# Patient Record
Sex: FEMALE | Race: WHITE | NOT HISPANIC OR LATINO
[De-identification: names, ages, dates, MRNs, and addresses within clinical notes are randomized per-mention and may not be internally consistent; named-entity substitution may affect disease eponyms.]

---

## 2017-01-25 ENCOUNTER — APPOINTMENT (OUTPATIENT)
Dept: INTERNAL MEDICINE | Facility: CLINIC | Age: 27
End: 2017-01-25

## 2017-01-25 ENCOUNTER — NON-APPOINTMENT (OUTPATIENT)
Age: 27
End: 2017-01-25

## 2017-01-25 VITALS — HEIGHT: 65 IN | WEIGHT: 184 LBS | BODY MASS INDEX: 30.66 KG/M2

## 2017-01-25 VITALS
TEMPERATURE: 96.7 F | RESPIRATION RATE: 14 BRPM | HEART RATE: 78 BPM | SYSTOLIC BLOOD PRESSURE: 110 MMHG | DIASTOLIC BLOOD PRESSURE: 72 MMHG

## 2017-01-25 DIAGNOSIS — Z82.49 FAMILY HISTORY OF ISCHEMIC HEART DISEASE AND OTHER DISEASES OF THE CIRCULATORY SYSTEM: ICD-10-CM

## 2017-01-25 DIAGNOSIS — J30.9 ALLERGIC RHINITIS, UNSPECIFIED: ICD-10-CM

## 2017-01-25 DIAGNOSIS — Z78.9 OTHER SPECIFIED HEALTH STATUS: ICD-10-CM

## 2017-01-25 DIAGNOSIS — I37.1 NONRHEUMATIC PULMONARY VALVE INSUFFICIENCY: ICD-10-CM

## 2017-01-25 DIAGNOSIS — Z00.00 ENCOUNTER FOR GENERAL ADULT MEDICAL EXAMINATION W/OUT ABNORMAL FINDINGS: ICD-10-CM

## 2017-01-25 RX ORDER — CETIRIZINE HCL 10 MG
10 TABLET ORAL
Refills: 0 | Status: ACTIVE | COMMUNITY

## 2017-01-26 ENCOUNTER — RX RENEWAL (OUTPATIENT)
Age: 27
End: 2017-01-26

## 2017-01-26 LAB
25(OH)D3 SERPL-MCNC: 6.8 NG/ML
ALBUMIN SERPL ELPH-MCNC: 4.4 G/DL
ALP BLD-CCNC: 80 U/L
ALT SERPL-CCNC: 9 U/L
ANION GAP SERPL CALC-SCNC: 22 MMOL/L
AST SERPL-CCNC: 19 U/L
BASOPHILS # BLD AUTO: 0.02 K/UL
BASOPHILS NFR BLD AUTO: 0.3 %
BILIRUB SERPL-MCNC: 0.4 MG/DL
BUN SERPL-MCNC: 14 MG/DL
CALCIUM SERPL-MCNC: 9.3 MG/DL
CHLORIDE SERPL-SCNC: 106 MMOL/L
CHOLEST SERPL-MCNC: 272 MG/DL
CHOLEST/HDLC SERPL: 3.4 RATIO
CO2 SERPL-SCNC: 16 MMOL/L
CREAT SERPL-MCNC: 0.71 MG/DL
EOSINOPHIL # BLD AUTO: 0.28 K/UL
EOSINOPHIL NFR BLD AUTO: 3.6 %
GLUCOSE SERPL-MCNC: 79 MG/DL
HCT VFR BLD CALC: 40.8 %
HDLC SERPL-MCNC: 79 MG/DL
HGB BLD-MCNC: 13.3 G/DL
IMM GRANULOCYTES NFR BLD AUTO: 0.1 %
LDLC SERPL CALC-MCNC: 171 MG/DL
LYMPHOCYTES # BLD AUTO: 3.01 K/UL
LYMPHOCYTES NFR BLD AUTO: 38.6 %
MAN DIFF?: NORMAL
MCHC RBC-ENTMCNC: 31.8 PG
MCHC RBC-ENTMCNC: 32.6 GM/DL
MCV RBC AUTO: 97.6 FL
MONOCYTES # BLD AUTO: 0.5 K/UL
MONOCYTES NFR BLD AUTO: 6.4 %
NEUTROPHILS # BLD AUTO: 3.98 K/UL
NEUTROPHILS NFR BLD AUTO: 51 %
PLATELET # BLD AUTO: 295 K/UL
POTASSIUM SERPL-SCNC: 4.4 MMOL/L
PROT SERPL-MCNC: 7.7 G/DL
RBC # BLD: 4.18 M/UL
RBC # FLD: 12.4 %
SODIUM SERPL-SCNC: 144 MMOL/L
TRIGL SERPL-MCNC: 110 MG/DL
WBC # FLD AUTO: 7.8 K/UL

## 2017-01-27 LAB
T4 FREE SERPL-MCNC: 1.2 NG/DL
TSH SERPL-ACNC: 3.22 UIU/ML

## 2017-06-16 PROBLEM — Z00.00 ENCOUNTER FOR PREVENTIVE HEALTH EXAMINATION: Noted: 2017-06-16

## 2017-06-22 ENCOUNTER — APPOINTMENT (OUTPATIENT)
Dept: ORTHOPEDIC SURGERY | Facility: CLINIC | Age: 27
End: 2017-06-22

## 2017-06-22 VITALS
HEIGHT: 66 IN | HEART RATE: 80 BPM | BODY MASS INDEX: 28.93 KG/M2 | WEIGHT: 180 LBS | DIASTOLIC BLOOD PRESSURE: 81 MMHG | SYSTOLIC BLOOD PRESSURE: 123 MMHG

## 2017-06-22 VITALS
HEIGHT: 66 IN | WEIGHT: 180 LBS | BODY MASS INDEX: 28.93 KG/M2 | HEART RATE: 80 BPM | SYSTOLIC BLOOD PRESSURE: 123 MMHG | DIASTOLIC BLOOD PRESSURE: 81 MMHG

## 2017-06-22 DIAGNOSIS — Z78.9 OTHER SPECIFIED HEALTH STATUS: ICD-10-CM

## 2017-06-22 DIAGNOSIS — Z87.891 PERSONAL HISTORY OF NICOTINE DEPENDENCE: ICD-10-CM

## 2017-06-22 RX ORDER — CETIRIZINE HCL 10 MG
TABLET ORAL
Refills: 0 | Status: ACTIVE | COMMUNITY

## 2017-06-22 RX ORDER — FLUTICASONE PROPIONATE 50 MCG
SPRAY, SUSPENSION NASAL
Refills: 0 | Status: ACTIVE | COMMUNITY

## 2017-06-22 RX ORDER — LEVONORGESTREL / ETHINYL ESTRADIOL AND ETHINYL ESTRADIOL 150-30(84)
KIT ORAL
Refills: 0 | Status: ACTIVE | COMMUNITY

## 2017-07-20 ENCOUNTER — APPOINTMENT (OUTPATIENT)
Dept: NEUROSURGERY | Facility: CLINIC | Age: 27
End: 2017-07-20

## 2017-09-12 ENCOUNTER — APPOINTMENT (OUTPATIENT)
Dept: SPINE | Facility: CLINIC | Age: 27
End: 2017-09-12
Payer: MEDICAID

## 2017-09-12 VITALS
BODY MASS INDEX: 29.73 KG/M2 | SYSTOLIC BLOOD PRESSURE: 116 MMHG | HEIGHT: 66 IN | DIASTOLIC BLOOD PRESSURE: 74 MMHG | WEIGHT: 185 LBS

## 2017-09-12 PROCEDURE — 99204 OFFICE O/P NEW MOD 45 MIN: CPT

## 2017-09-12 RX ORDER — ERGOCALCIFEROL 1.25 MG/1
1.25 MG CAPSULE, LIQUID FILLED ORAL
Qty: 24 | Refills: 3 | Status: DISCONTINUED | COMMUNITY
Start: 2017-01-26 | End: 2017-09-12

## 2017-09-12 RX ORDER — NAPROXEN SODIUM 220 MG
TABLET ORAL
Refills: 0 | Status: ACTIVE | COMMUNITY

## 2017-09-12 RX ORDER — LEVONORGESTREL / ETHINYL ESTRADIOL AND ETHINYL ESTRADIOL 150-30(84)
KIT ORAL
Refills: 0 | Status: ACTIVE | COMMUNITY

## 2017-09-12 RX ORDER — FLUTICASONE PROPIONATE 50 UG/1
50 SPRAY, METERED NASAL
Refills: 0 | Status: DISCONTINUED | COMMUNITY
End: 2017-09-12

## 2017-10-13 ENCOUNTER — APPOINTMENT (OUTPATIENT)
Dept: MRI IMAGING | Facility: CLINIC | Age: 27
End: 2017-10-13

## 2017-10-13 ENCOUNTER — OUTPATIENT (OUTPATIENT)
Dept: OUTPATIENT SERVICES | Facility: HOSPITAL | Age: 27
LOS: 1 days | End: 2017-10-13
Payer: MEDICAID

## 2017-10-13 ENCOUNTER — APPOINTMENT (OUTPATIENT)
Dept: MRI IMAGING | Facility: CLINIC | Age: 27
End: 2017-10-13
Payer: MEDICAID

## 2017-10-13 DIAGNOSIS — Z00.8 ENCOUNTER FOR OTHER GENERAL EXAMINATION: ICD-10-CM

## 2017-10-13 PROCEDURE — 72146 MRI CHEST SPINE W/O DYE: CPT | Mod: 26

## 2017-10-13 PROCEDURE — 72146 MRI CHEST SPINE W/O DYE: CPT

## 2017-10-13 PROCEDURE — 72148 MRI LUMBAR SPINE W/O DYE: CPT | Mod: 26

## 2017-10-13 PROCEDURE — 72148 MRI LUMBAR SPINE W/O DYE: CPT

## 2017-11-01 ENCOUNTER — APPOINTMENT (OUTPATIENT)
Dept: SPINE | Facility: CLINIC | Age: 27
End: 2017-11-01
Payer: MEDICAID

## 2017-11-01 VITALS
HEART RATE: 77 BPM | HEIGHT: 66 IN | BODY MASS INDEX: 30.53 KG/M2 | SYSTOLIC BLOOD PRESSURE: 116 MMHG | DIASTOLIC BLOOD PRESSURE: 79 MMHG | WEIGHT: 190 LBS

## 2017-11-01 DIAGNOSIS — M54.9 DORSALGIA, UNSPECIFIED: ICD-10-CM

## 2017-11-01 DIAGNOSIS — G89.29 DORSALGIA, UNSPECIFIED: ICD-10-CM

## 2017-11-01 PROCEDURE — 99214 OFFICE O/P EST MOD 30 MIN: CPT

## 2018-10-09 ENCOUNTER — APPOINTMENT (OUTPATIENT)
Dept: INTERNAL MEDICINE | Facility: CLINIC | Age: 28
End: 2018-10-09
Payer: MEDICAID

## 2018-10-09 VITALS
TEMPERATURE: 98.8 F | DIASTOLIC BLOOD PRESSURE: 84 MMHG | SYSTOLIC BLOOD PRESSURE: 124 MMHG | HEIGHT: 66 IN | WEIGHT: 200 LBS | BODY MASS INDEX: 32.14 KG/M2 | OXYGEN SATURATION: 98 % | HEART RATE: 85 BPM

## 2018-10-09 DIAGNOSIS — E55.9 VITAMIN D DEFICIENCY, UNSPECIFIED: ICD-10-CM

## 2018-10-09 DIAGNOSIS — E78.5 HYPERLIPIDEMIA, UNSPECIFIED: ICD-10-CM

## 2018-10-09 DIAGNOSIS — G62.9 POLYNEUROPATHY, UNSPECIFIED: ICD-10-CM

## 2018-10-09 DIAGNOSIS — E66.9 OBESITY, UNSPECIFIED: ICD-10-CM

## 2018-10-09 PROCEDURE — G0447 BEHAVIOR COUNSEL OBESITY 15M: CPT

## 2018-10-09 PROCEDURE — 99215 OFFICE O/P EST HI 40 MIN: CPT | Mod: 25

## 2018-10-09 PROCEDURE — 36415 COLL VENOUS BLD VENIPUNCTURE: CPT

## 2018-10-09 NOTE — REVIEW OF SYSTEMS
[Abdominal Pain] : no abdominal pain [Nausea] : no nausea [Constipation] : no constipation [Diarrhea] : diarrhea [Vomiting] : no vomiting [Heartburn] : heartburn [Negative] : Heme/Lymph

## 2018-10-09 NOTE — PHYSICAL EXAM
[Normal Female:] : bladder was normal on palpation [Normal] : normal gait, coordination grossly intact, no focal deficits [de-identified] : Tenderness to lumbar and sacral area

## 2018-10-09 NOTE — ASSESSMENT
[FreeTextEntry1] : 1) GERD: Will start trial of pantoprazole 40 mg po qd. Advised to return in one month. No red flag symptoms on exam. Lifestyle counseling provided\par 2)Neuropathy: Spinal orthopedic doctor referral given to patient. Advised to continue with aquatic therapy. Will start trial of duloxetine. \par 3)Lumbar radiculopathy: Will place referral for spinal orthopedic doctor. Advised to continue with aquatic therapy\par 4) Obesity: Patient on diet, encouraged to continue with diet and moderate activity. Advised to continue with aquatic therapy, and walking as tolerated,\par 5)Hypovitaminosis D: Check vitamin d levels\par 6) Hyperlipidemia: Will check lipid panel. Lifestyle counseling and walking as tolerated advised.

## 2018-10-09 NOTE — HISTORY OF PRESENT ILLNESS
[FreeTextEntry8] : Patient presents to address reflux symptoms. States she has burning sensation in chest and up to throat. Symptoms worse when eating certain foods including sauce, pizza or alcohol. Patient denies any pain on swallowing, vomiting, weight loss or difficulty swallowing. Patient states she had two episodes of bleeding on wiping, after painful bowel movements. Patient also has back pain and shooting pain down leg. Gabapentin did not help with symptoms.

## 2018-10-09 NOTE — COUNSELING
[Weight management counseling provided] : Weight management [___ min/wk activity recommended] : [unfilled] min/wk activity recommended [ - Behavioral Counseling for Obesity (Face-to-Face for 15 Minutes)] : Behavioral Counseling for Obesity (Face-to-Face for 15 Minutes)

## 2018-10-11 LAB
25(OH)D3 SERPL-MCNC: 30.6 NG/ML
ALBUMIN SERPL ELPH-MCNC: 4.3 G/DL
ALP BLD-CCNC: 105 U/L
ALT SERPL-CCNC: 25 U/L
ANION GAP SERPL CALC-SCNC: 19 MMOL/L
AST SERPL-CCNC: 23 U/L
BASOPHILS # BLD AUTO: 0.02 K/UL
BASOPHILS NFR BLD AUTO: 0.3 %
BILIRUB SERPL-MCNC: 0.3 MG/DL
BUN SERPL-MCNC: 11 MG/DL
CALCIUM SERPL-MCNC: 9 MG/DL
CHLORIDE SERPL-SCNC: 107 MMOL/L
CHOLEST SERPL-MCNC: 277 MG/DL
CHOLEST/HDLC SERPL: 4.5 RATIO
CO2 SERPL-SCNC: 18 MMOL/L
CREAT SERPL-MCNC: 0.75 MG/DL
EOSINOPHIL # BLD AUTO: 0.4 K/UL
EOSINOPHIL NFR BLD AUTO: 5 %
GLUCOSE SERPL-MCNC: 83 MG/DL
HBA1C MFR BLD HPLC: 5.1 %
HCT VFR BLD CALC: 42.8 %
HDLC SERPL-MCNC: 62 MG/DL
HGB BLD-MCNC: 13.5 G/DL
IMM GRANULOCYTES NFR BLD AUTO: 0.1 %
LDLC SERPL CALC-MCNC: 195 MG/DL
LYMPHOCYTES # BLD AUTO: 3.55 K/UL
LYMPHOCYTES NFR BLD AUTO: 44.5 %
MAN DIFF?: NORMAL
MCHC RBC-ENTMCNC: 30.8 PG
MCHC RBC-ENTMCNC: 31.5 GM/DL
MCV RBC AUTO: 97.5 FL
MONOCYTES # BLD AUTO: 0.47 K/UL
MONOCYTES NFR BLD AUTO: 5.9 %
NEUTROPHILS # BLD AUTO: 3.52 K/UL
NEUTROPHILS NFR BLD AUTO: 44.2 %
PLATELET # BLD AUTO: 321 K/UL
POTASSIUM SERPL-SCNC: 4.5 MMOL/L
PROT SERPL-MCNC: 7.7 G/DL
RBC # BLD: 4.39 M/UL
RBC # FLD: 13.2 %
SODIUM SERPL-SCNC: 144 MMOL/L
TRIGL SERPL-MCNC: 102 MG/DL
TSH SERPL-ACNC: 6.86 UIU/ML
WBC # FLD AUTO: 7.97 K/UL

## 2018-10-12 PROBLEM — E78.5 HYPERLIPEMIA: Status: ACTIVE | Noted: 2018-10-12

## 2018-11-07 ENCOUNTER — RX RENEWAL (OUTPATIENT)
Age: 28
End: 2018-11-07

## 2018-11-12 ENCOUNTER — APPOINTMENT (OUTPATIENT)
Dept: INTERNAL MEDICINE | Facility: CLINIC | Age: 28
End: 2018-11-12

## 2018-12-04 ENCOUNTER — RX RENEWAL (OUTPATIENT)
Age: 28
End: 2018-12-04

## 2018-12-18 ENCOUNTER — APPOINTMENT (OUTPATIENT)
Dept: INTERNAL MEDICINE | Facility: CLINIC | Age: 28
End: 2018-12-18
Payer: MEDICAID

## 2018-12-18 VITALS — RESPIRATION RATE: 16 BRPM

## 2018-12-18 VITALS
BODY MASS INDEX: 34.39 KG/M2 | TEMPERATURE: 98.6 F | SYSTOLIC BLOOD PRESSURE: 127 MMHG | WEIGHT: 214 LBS | HEIGHT: 66 IN | HEART RATE: 85 BPM | OXYGEN SATURATION: 98 % | DIASTOLIC BLOOD PRESSURE: 86 MMHG

## 2018-12-18 DIAGNOSIS — R11.10 VOMITING, UNSPECIFIED: ICD-10-CM

## 2018-12-18 DIAGNOSIS — R53.83 OTHER FATIGUE: ICD-10-CM

## 2018-12-18 DIAGNOSIS — M54.16 RADICULOPATHY, LUMBAR REGION: ICD-10-CM

## 2018-12-18 PROCEDURE — 99214 OFFICE O/P EST MOD 30 MIN: CPT | Mod: 25

## 2018-12-18 PROCEDURE — 36415 COLL VENOUS BLD VENIPUNCTURE: CPT

## 2018-12-18 RX ORDER — DULOXETINE HYDROCHLORIDE 30 MG/1
30 CAPSULE, DELAYED RELEASE PELLETS ORAL
Qty: 30 | Refills: 0 | Status: DISCONTINUED | COMMUNITY
Start: 2018-10-09 | End: 2018-12-18

## 2018-12-18 RX ORDER — PREGABALIN 100 MG/1
100 CAPSULE ORAL
Qty: 30 | Refills: 0 | Status: ACTIVE | COMMUNITY
Start: 2018-12-18

## 2018-12-18 NOTE — REVIEW OF SYSTEMS
[Fatigue] : fatigue [Constipation] : constipation [Vomiting] : vomiting [Hair Changes] : hair changes [Negative] : Heme/Lymph [Fever] : no fever [Chills] : no chills [Hot Flashes] : no hot flashes [Night Sweats] : no night sweats [Abdominal Pain] : no abdominal pain [Diarrhea] : diarrhea [Heartburn] : no heartburn [Melena] : no melena [Mole Changes] : no mole changes [Nail Changes] : no nail changes [Skin Rash] : no skin rash [de-identified] : dry skin

## 2018-12-18 NOTE — ASSESSMENT
[FreeTextEntry1] : 1) Lethargy: Given cold intolerance, skin and hair changes, will check TSH. Hypothyroidism is high in the differential. Will also check Vitamin B12 and Vitamin D levels and CBC for anemia\par 2) Vomiting: May be secondary to lethargy from hypothyroidism, will check CMP, amylase, lipase and abdominal U/S. \par 3) Elevated TSH: Given symptoms, will check TSH and free T4\par 4) GERD: resolved, advised to attempt pantoprazole 20 mg.\par 5) Hyperlipidemia: Patient has taken atorvastatin as prescribed, has been attempting to watch diet. Exercise has been difficult due to back pain. Will check lipid levels.\par 6) Lumbar radiculopathy: Patient on Lyrica as per pain management. \par Patient to return to office in 6-8 weeks to reassess.

## 2018-12-18 NOTE — PHYSICAL EXAM
[No Acute Distress] : no acute distress [Well Nourished] : well nourished [Well Developed] : well developed [Well-Appearing] : well-appearing [Normal Sclera/Conjunctiva] : normal sclera/conjunctiva [PERRL] : pupils equal round and reactive to light [EOMI] : extraocular movements intact [Normal Outer Ear/Nose] : the outer ears and nose were normal in appearance [Normal Oropharynx] : the oropharynx was normal [Normal TMs] : both tympanic membranes were normal [No JVD] : no jugular venous distention [Supple] : supple [No Lymphadenopathy] : no lymphadenopathy [Thyroid Normal, No Nodules] : the thyroid was normal and there were no nodules present [No Respiratory Distress] : no respiratory distress  [Clear to Auscultation] : lungs were clear to auscultation bilaterally [No Accessory Muscle Use] : no accessory muscle use [Normal Rate] : normal rate  [Regular Rhythm] : with a regular rhythm [Normal S1, S2] : normal S1 and S2 [No Murmur] : no murmur heard [No Carotid Bruits] : no carotid bruits [No Abdominal Bruit] : a ~M bruit was not heard ~T in the abdomen [No Varicosities] : no varicosities [Pedal Pulses Present] : the pedal pulses are present [No Edema] : there was no peripheral edema [No Extremity Clubbing/Cyanosis] : no extremity clubbing/cyanosis [No Palpable Aorta] : no palpable aorta [Soft] : abdomen soft [Non Tender] : non-tender [Non-distended] : non-distended [No Masses] : no abdominal mass palpated [No HSM] : no HSM [Normal Bowel Sounds] : normal bowel sounds [Normal Supraclavicular Nodes] : no supraclavicular lymphadenopathy [Normal Posterior Cervical Nodes] : no posterior cervical lymphadenopathy [Normal Anterior Cervical Nodes] : no anterior cervical lymphadenopathy [No CVA Tenderness] : no CVA  tenderness [No Joint Swelling] : no joint swelling [Grossly Normal Strength/Tone] : grossly normal strength/tone [No Rash] : no rash [de-identified] : dry skin

## 2018-12-18 NOTE — HISTORY OF PRESENT ILLNESS
[FreeTextEntry1] : Patient presents for f/u of GERD and for concern over lethargy [de-identified] : Patient states that GERD has improved. Patient also states she feels more lethargic last few weeks. Patient also has had constipation last few months intermittently and associated dry skin, hair loss and dry skin. Patient has had depression on and off since injuring back. Patient states she had some family stressors last few weeks but does not feel more depressed than usual. Patient also states that she has vomiting after eating last few weeks. Patient denies any changes in diet, abdominal pain, pain while eating, change in bowel habits or bloody stool. Patient denies any fevers, chills or night sweats. Patient on contraception and states that menstrual periods have not been more heavy than usual. Patient denies any headache with vomiting or any anxiety. Patient denies any rashes, myalgias or arthralgias.

## 2018-12-19 ENCOUNTER — MESSAGE (OUTPATIENT)
Age: 28
End: 2018-12-19

## 2018-12-19 LAB
25(OH)D3 SERPL-MCNC: 18.2 NG/ML
ALBUMIN SERPL ELPH-MCNC: 4 G/DL
ALP BLD-CCNC: 93 U/L
ALT SERPL-CCNC: 8 U/L
AMYLASE/CREAT SERPL: 116 U/L
ANION GAP SERPL CALC-SCNC: 16 MMOL/L
AST SERPL-CCNC: 15 U/L
BASOPHILS # BLD AUTO: 0.01 K/UL
BASOPHILS NFR BLD AUTO: 0.1 %
BILIRUB SERPL-MCNC: 0.3 MG/DL
BUN SERPL-MCNC: 11 MG/DL
CALCIUM SERPL-MCNC: 9 MG/DL
CHLORIDE SERPL-SCNC: 107 MMOL/L
CHOLEST SERPL-MCNC: 201 MG/DL
CHOLEST/HDLC SERPL: 3.7 RATIO
CK SERPL-CCNC: 38 U/L
CO2 SERPL-SCNC: 18 MMOL/L
CREAT SERPL-MCNC: 0.66 MG/DL
EOSINOPHIL # BLD AUTO: 0.3 K/UL
EOSINOPHIL NFR BLD AUTO: 3.4 %
GLUCOSE SERPL-MCNC: 83 MG/DL
HCT VFR BLD CALC: 38.5 %
HDLC SERPL-MCNC: 55 MG/DL
HGB BLD-MCNC: 12.8 G/DL
IMM GRANULOCYTES NFR BLD AUTO: 0.2 %
LDLC SERPL CALC-MCNC: 121 MG/DL
LPL SERPL-CCNC: 59 U/L
LYMPHOCYTES # BLD AUTO: 3.21 K/UL
LYMPHOCYTES NFR BLD AUTO: 36.5 %
MAN DIFF?: NORMAL
MCHC RBC-ENTMCNC: 31 PG
MCHC RBC-ENTMCNC: 33.2 GM/DL
MCV RBC AUTO: 93.2 FL
MONOCYTES # BLD AUTO: 0.51 K/UL
MONOCYTES NFR BLD AUTO: 5.8 %
NEUTROPHILS # BLD AUTO: 4.75 K/UL
NEUTROPHILS NFR BLD AUTO: 54 %
PLATELET # BLD AUTO: 311 K/UL
POTASSIUM SERPL-SCNC: 3.9 MMOL/L
PROT SERPL-MCNC: 7.1 G/DL
RBC # BLD: 4.13 M/UL
RBC # FLD: 13 %
SODIUM SERPL-SCNC: 141 MMOL/L
TRIGL SERPL-MCNC: 125 MG/DL
TSH SERPL-ACNC: 3.73 UIU/ML
VIT B12 SERPL-MCNC: 223 PG/ML
WBC # FLD AUTO: 8.8 K/UL

## 2019-01-15 ENCOUNTER — APPOINTMENT (OUTPATIENT)
Dept: ORTHOPEDIC SURGERY | Facility: CLINIC | Age: 29
End: 2019-01-15
Payer: MEDICAID

## 2019-01-15 VITALS
DIASTOLIC BLOOD PRESSURE: 88 MMHG | BODY MASS INDEX: 33.27 KG/M2 | WEIGHT: 207 LBS | SYSTOLIC BLOOD PRESSURE: 127 MMHG | HEIGHT: 66 IN | HEART RATE: 91 BPM

## 2019-01-15 DIAGNOSIS — J18.9 PNEUMONIA, UNSPECIFIED ORGANISM: ICD-10-CM

## 2019-01-15 DIAGNOSIS — M54.16 RADICULOPATHY, LUMBAR REGION: ICD-10-CM

## 2019-01-15 DIAGNOSIS — K21.0 GASTRO-ESOPHAGEAL REFLUX DISEASE WITH ESOPHAGITIS: ICD-10-CM

## 2019-01-15 DIAGNOSIS — M54.41 LUMBAGO WITH SCIATICA, LEFT SIDE: ICD-10-CM

## 2019-01-15 DIAGNOSIS — M54.42 LUMBAGO WITH SCIATICA, LEFT SIDE: ICD-10-CM

## 2019-01-15 DIAGNOSIS — G89.29 LUMBAGO WITH SCIATICA, LEFT SIDE: ICD-10-CM

## 2019-01-15 PROCEDURE — 99215 OFFICE O/P EST HI 40 MIN: CPT

## 2019-01-15 RX ORDER — DICLOFENAC SODIUM 75 MG/1
75 TABLET, DELAYED RELEASE ORAL
Qty: 60 | Refills: 0 | Status: ACTIVE | COMMUNITY
Start: 2019-01-15 | End: 1900-01-01

## 2019-01-22 ENCOUNTER — APPOINTMENT (OUTPATIENT)
Dept: INTERNAL MEDICINE | Facility: CLINIC | Age: 29
End: 2019-01-22

## 2019-02-11 ENCOUNTER — RX RENEWAL (OUTPATIENT)
Age: 29
End: 2019-02-11

## 2019-06-24 ENCOUNTER — RX RENEWAL (OUTPATIENT)
Age: 29
End: 2019-06-24

## 2019-06-24 RX ORDER — ERGOCALCIFEROL 1.25 MG/1
1.25 MG CAPSULE, LIQUID FILLED ORAL
Qty: 12 | Refills: 1 | Status: ACTIVE | COMMUNITY
Start: 2018-12-19 | End: 1900-01-01

## 2019-08-14 ENCOUNTER — LABORATORY RESULT (OUTPATIENT)
Age: 29
End: 2019-08-14

## 2019-08-14 ENCOUNTER — APPOINTMENT (OUTPATIENT)
Dept: INTERNAL MEDICINE | Facility: CLINIC | Age: 29
End: 2019-08-14
Payer: MEDICAID

## 2019-08-14 VITALS
BODY MASS INDEX: 32.55 KG/M2 | WEIGHT: 200.16 LBS | TEMPERATURE: 98.3 F | DIASTOLIC BLOOD PRESSURE: 89 MMHG | OXYGEN SATURATION: 98 % | HEIGHT: 65.75 IN | HEART RATE: 73 BPM | SYSTOLIC BLOOD PRESSURE: 123 MMHG

## 2019-08-14 DIAGNOSIS — G25.81 RESTLESS LEGS SYNDROME: ICD-10-CM

## 2019-08-14 DIAGNOSIS — E53.8 DEFICIENCY OF OTHER SPECIFIED B GROUP VITAMINS: ICD-10-CM

## 2019-08-14 DIAGNOSIS — K21.9 GASTRO-ESOPHAGEAL REFLUX DISEASE W/OUT ESOPHAGITIS: ICD-10-CM

## 2019-08-14 DIAGNOSIS — E78.2 MIXED HYPERLIPIDEMIA: ICD-10-CM

## 2019-08-14 PROCEDURE — 99214 OFFICE O/P EST MOD 30 MIN: CPT | Mod: 25

## 2019-08-14 PROCEDURE — 36415 COLL VENOUS BLD VENIPUNCTURE: CPT

## 2019-08-15 PROBLEM — G25.81 RESTLESS LEG SYNDROME: Status: ACTIVE | Noted: 2019-08-15

## 2019-08-15 PROBLEM — E53.8 LOW VITAMIN B12 LEVEL: Status: ACTIVE | Noted: 2019-08-15

## 2019-08-19 ENCOUNTER — MESSAGE (OUTPATIENT)
Age: 29
End: 2019-08-19

## 2019-08-19 LAB
25(OH)D3 SERPL-MCNC: 35.2 NG/ML
ALBUMIN SERPL ELPH-MCNC: 4.3 G/DL
ALP BLD-CCNC: 92 U/L
ALT SERPL-CCNC: 20 U/L
ANION GAP SERPL CALC-SCNC: 15 MMOL/L
AST SERPL-CCNC: 24 U/L
BASOPHILS # BLD AUTO: 0.02 K/UL
BASOPHILS NFR BLD AUTO: 0.3 %
BILIRUB SERPL-MCNC: 0.5 MG/DL
BUN SERPL-MCNC: 13 MG/DL
CALCIUM SERPL-MCNC: 9.2 MG/DL
CHLORIDE SERPL-SCNC: 103 MMOL/L
CHOLEST SERPL-MCNC: 197 MG/DL
CHOLEST/HDLC SERPL: 3.2 RATIO
CO2 SERPL-SCNC: 20 MMOL/L
CREAT SERPL-MCNC: 0.72 MG/DL
EOSINOPHIL # BLD AUTO: 0.3 K/UL
EOSINOPHIL NFR BLD AUTO: 5 %
ESTIMATED AVERAGE GLUCOSE: 94 MG/DL
FERRITIN SERPL-MCNC: 21 NG/ML
GLUCOSE SERPL-MCNC: 75 MG/DL
HBA1C MFR BLD HPLC: 4.9 %
HCT VFR BLD CALC: 40.4 %
HDLC SERPL-MCNC: 62 MG/DL
HGB BLD-MCNC: 13.5 G/DL
IMM GRANULOCYTES NFR BLD AUTO: 0.2 %
IRON SATN MFR SERPL: 22 %
IRON SERPL-MCNC: 87 UG/DL
LDLC SERPL CALC-MCNC: 121 MG/DL
LYMPHOCYTES # BLD AUTO: 2.1 K/UL
LYMPHOCYTES NFR BLD AUTO: 34.7 %
MAN DIFF?: NORMAL
MCHC RBC-ENTMCNC: 31.2 PG
MCHC RBC-ENTMCNC: 33.4 GM/DL
MCV RBC AUTO: 93.3 FL
MONOCYTES # BLD AUTO: 0.38 K/UL
MONOCYTES NFR BLD AUTO: 6.3 %
NEUTROPHILS # BLD AUTO: 3.25 K/UL
NEUTROPHILS NFR BLD AUTO: 53.5 %
PLATELET # BLD AUTO: 306 K/UL
POTASSIUM SERPL-SCNC: 4.1 MMOL/L
PROT SERPL-MCNC: 7.4 G/DL
RBC # BLD: 4.33 M/UL
RBC # FLD: 12.5 %
SODIUM SERPL-SCNC: 138 MMOL/L
TIBC SERPL-MCNC: 394 UG/DL
TRIGL SERPL-MCNC: 68 MG/DL
TSH SERPL-ACNC: 4.87 UIU/ML
UIBC SERPL-MCNC: 307 UG/DL
VIT B12 SERPL-MCNC: 220 PG/ML
WBC # FLD AUTO: 6.06 K/UL

## 2019-08-23 NOTE — REVIEW OF SYSTEMS
[Heartburn] : heartburn [Back Pain] : back pain [Negative] : Integumentary [Constipation] : no constipation [Abdominal Pain] : no abdominal pain [Diarrhea] : diarrhea [Vomiting] : no vomiting [Melena] : no melena

## 2019-08-23 NOTE — HISTORY OF PRESENT ILLNESS
[de-identified] : Patient states that she has reflux whenever she eats at times house over eats out. Patient feels burning sensation and sometimes will have vomiting with burning sensation. Patient has tried avoidance of triggers and has lost weight through diet. patient denies any dysphagia odynophagia or any family history of any stomach cancer. Right leg has sensation of having something crawling. patient denies any cramps. Patient's right leg feels restless. [FreeTextEntry1] : Patient presents to the office for reflux, HLD and low Vitamin B12

## 2019-08-23 NOTE — ASSESSMENT
[FreeTextEntry1] : 1) GERD: Patient has refractory GERD on PPI 20-40 mg. Keep pantoprazole 40 mg and add pepcid at night. GI referral placed for refractory symptoms\par 2) HLD: Compliant with statin, check levels\par 3) Restless leg syndrome: Check ferritin and TSH\par 4) Low Vitamin B12: Check levels, patient has been on PPI close to a year

## 2019-08-27 ENCOUNTER — MESSAGE (OUTPATIENT)
Age: 29
End: 2019-08-27

## 2019-09-20 ENCOUNTER — RX RENEWAL (OUTPATIENT)
Age: 29
End: 2019-09-20

## 2019-10-02 PROBLEM — R11.10 VOMITING ALONE: Status: ACTIVE | Noted: 2018-12-18

## 2019-10-09 ENCOUNTER — RX RENEWAL (OUTPATIENT)
Age: 29
End: 2019-10-09

## 2019-12-08 ENCOUNTER — RX RENEWAL (OUTPATIENT)
Age: 29
End: 2019-12-08

## 2020-04-03 ENCOUNTER — RX RENEWAL (OUTPATIENT)
Age: 30
End: 2020-04-03

## 2020-09-23 ENCOUNTER — RX RENEWAL (OUTPATIENT)
Age: 30
End: 2020-09-23

## 2020-09-23 RX ORDER — ATORVASTATIN CALCIUM 20 MG/1
20 TABLET, FILM COATED ORAL
Qty: 30 | Refills: 11 | Status: ACTIVE | COMMUNITY
Start: 2018-10-12 | End: 1900-01-01

## 2020-11-03 ENCOUNTER — RX RENEWAL (OUTPATIENT)
Age: 30
End: 2020-11-03

## 2020-11-04 ENCOUNTER — RX RENEWAL (OUTPATIENT)
Age: 30
End: 2020-11-04

## 2021-02-17 ENCOUNTER — RX RENEWAL (OUTPATIENT)
Age: 31
End: 2021-02-17

## 2021-02-17 RX ORDER — FAMOTIDINE 20 MG/1
20 TABLET, FILM COATED ORAL
Qty: 30 | Refills: 2 | Status: ACTIVE | COMMUNITY
Start: 2019-08-14 | End: 1900-01-01

## 2021-02-17 RX ORDER — PANTOPRAZOLE 40 MG/1
40 TABLET, DELAYED RELEASE ORAL
Qty: 30 | Refills: 2 | Status: ACTIVE | COMMUNITY
Start: 2018-10-09 | End: 1900-01-01